# Patient Record
Sex: FEMALE | Race: WHITE | NOT HISPANIC OR LATINO
[De-identification: names, ages, dates, MRNs, and addresses within clinical notes are randomized per-mention and may not be internally consistent; named-entity substitution may affect disease eponyms.]

---

## 2017-09-11 ENCOUNTER — APPOINTMENT (OUTPATIENT)
Dept: OBGYN | Facility: CLINIC | Age: 67
End: 2017-09-11
Payer: MEDICARE

## 2017-09-11 PROCEDURE — G0101: CPT

## 2017-12-06 ENCOUNTER — APPOINTMENT (OUTPATIENT)
Dept: OBGYN | Facility: CLINIC | Age: 67
End: 2017-12-06
Payer: MEDICARE

## 2017-12-06 PROCEDURE — 99214 OFFICE O/P EST MOD 30 MIN: CPT

## 2018-09-12 ENCOUNTER — APPOINTMENT (OUTPATIENT)
Dept: OBGYN | Facility: CLINIC | Age: 68
End: 2018-09-12

## 2018-09-13 ENCOUNTER — APPOINTMENT (OUTPATIENT)
Dept: OBGYN | Facility: CLINIC | Age: 68
End: 2018-09-13

## 2018-10-23 ENCOUNTER — APPOINTMENT (OUTPATIENT)
Dept: OBGYN | Facility: CLINIC | Age: 68
End: 2018-10-23
Payer: MEDICARE

## 2018-10-23 VITALS
DIASTOLIC BLOOD PRESSURE: 80 MMHG | HEART RATE: 71 BPM | SYSTOLIC BLOOD PRESSURE: 144 MMHG | WEIGHT: 138 LBS | BODY MASS INDEX: 24.45 KG/M2 | OXYGEN SATURATION: 98 % | HEIGHT: 63 IN | RESPIRATION RATE: 16 BRPM

## 2018-10-23 DIAGNOSIS — Z13.820 ENCOUNTER FOR SCREENING FOR OSTEOPOROSIS: ICD-10-CM

## 2018-10-23 DIAGNOSIS — R92.2 INCONCLUSIVE MAMMOGRAM: ICD-10-CM

## 2018-10-23 DIAGNOSIS — Z80.3 FAMILY HISTORY OF MALIGNANT NEOPLASM OF BREAST: ICD-10-CM

## 2018-10-23 DIAGNOSIS — O30.009 TWIN PREGNANCY, UNSPECIFIED NUMBER OF PLACENTA AND UNSPECIFIED NUMBER OF AMNIOTIC SACS, UNSPECIFIED TRIMESTER: ICD-10-CM

## 2018-10-23 DIAGNOSIS — Z12.31 ENCOUNTER FOR SCREENING MAMMOGRAM FOR MALIGNANT NEOPLASM OF BREAST: ICD-10-CM

## 2018-10-23 DIAGNOSIS — Z80.7 FAMILY HISTORY OF OTHER MALIGNANT NEOPLASMS OF LYMPHOID, HEMATOPOIETIC AND RELATED TISSUES: ICD-10-CM

## 2018-10-23 DIAGNOSIS — N39.3 STRESS INCONTINENCE (FEMALE) (MALE): ICD-10-CM

## 2018-10-23 DIAGNOSIS — Z80.42 FAMILY HISTORY OF MALIGNANT NEOPLASM OF PROSTATE: ICD-10-CM

## 2018-10-23 DIAGNOSIS — Z80.1 FAMILY HISTORY OF MALIGNANT NEOPLASM OF TRACHEA, BRONCHUS AND LUNG: ICD-10-CM

## 2018-10-23 DIAGNOSIS — K46.9 UNSPECIFIED ABDOMINAL HERNIA W/OUT OBSTRUCTION OR GANGRENE: ICD-10-CM

## 2018-10-23 DIAGNOSIS — Z87.59 PERSONAL HISTORY OF OTHER COMPLICATIONS OF PREGNANCY, CHILDBIRTH AND THE PUERPERIUM: ICD-10-CM

## 2018-10-23 DIAGNOSIS — D3A.8 OTHER BENIGN NEUROENDOCRINE TUMORS: ICD-10-CM

## 2018-10-23 PROCEDURE — G0101: CPT

## 2018-10-23 RX ORDER — OMEPRAZOLE 20 MG/1
20 CAPSULE, DELAYED RELEASE ORAL
Refills: 0 | Status: ACTIVE | COMMUNITY

## 2018-10-24 LAB — HPV HIGH+LOW RISK DNA PNL CVX: NOT DETECTED

## 2018-10-29 LAB — CYTOLOGY CVX/VAG DOC THIN PREP: NORMAL

## 2018-11-14 ENCOUNTER — APPOINTMENT (OUTPATIENT)
Dept: UROGYNECOLOGY | Facility: CLINIC | Age: 68
End: 2018-11-14
Payer: MEDICARE

## 2018-11-14 VITALS
HEART RATE: 74 BPM | BODY MASS INDEX: 24.63 KG/M2 | WEIGHT: 139 LBS | DIASTOLIC BLOOD PRESSURE: 82 MMHG | HEIGHT: 63 IN | SYSTOLIC BLOOD PRESSURE: 145 MMHG

## 2018-11-14 DIAGNOSIS — N39.41 URGE INCONTINENCE: ICD-10-CM

## 2018-11-14 DIAGNOSIS — N36.41 HYPERMOBILITY OF URETHRA: ICD-10-CM

## 2018-11-14 DIAGNOSIS — R39.15 URGENCY OF URINATION: ICD-10-CM

## 2018-11-14 LAB
BILIRUB UR QL STRIP: NORMAL
CLARITY UR: CLEAR
COLLECTION METHOD: NORMAL
GLUCOSE UR-MCNC: NORMAL
HCG UR QL: 0.2 EU/DL
HGB UR QL STRIP.AUTO: NORMAL
KETONES UR-MCNC: NORMAL
LEUKOCYTE ESTERASE UR QL STRIP: NORMAL
NITRITE UR QL STRIP: NORMAL
PH UR STRIP: 8.5
PROT UR STRIP-MCNC: NORMAL
SP GR UR STRIP: 1.01

## 2018-11-14 PROCEDURE — 99204 OFFICE O/P NEW MOD 45 MIN: CPT | Mod: 25

## 2018-11-14 PROCEDURE — 51701 INSERT BLADDER CATHETER: CPT

## 2018-11-14 PROCEDURE — 81003 URINALYSIS AUTO W/O SCOPE: CPT | Mod: QW

## 2018-12-05 ENCOUNTER — APPOINTMENT (OUTPATIENT)
Dept: UROGYNECOLOGY | Facility: CLINIC | Age: 68
End: 2018-12-05

## 2019-02-06 ENCOUNTER — OUTPATIENT (OUTPATIENT)
Dept: OUTPATIENT SERVICES | Facility: HOSPITAL | Age: 69
LOS: 1 days | End: 2019-02-06

## 2019-02-06 ENCOUNTER — APPOINTMENT (OUTPATIENT)
Dept: UROGYNECOLOGY | Facility: CLINIC | Age: 69
End: 2019-02-06
Payer: MEDICARE

## 2019-02-06 PROCEDURE — A4562: CPT

## 2019-02-06 PROCEDURE — 57160 INSERT PESSARY/OTHER DEVICE: CPT

## 2019-02-27 ENCOUNTER — APPOINTMENT (OUTPATIENT)
Dept: UROGYNECOLOGY | Facility: CLINIC | Age: 69
End: 2019-02-27
Payer: MEDICARE

## 2019-02-27 PROCEDURE — 99214 OFFICE O/P EST MOD 30 MIN: CPT

## 2019-02-27 NOTE — PROCEDURE
[Good Fit] : fits well [Discharge] : there is vaginal discharge [Pessary Inserted] : inserted [Pessary Washed] : washed [H2O] : H2O [None] : no bleeding [Bladder Scan Completed] : a pelvic/bladder ultrasound was performed to determine the residual volume. [Residual Volume ___] : the final residual volume was [unfilled] cc [Medication Review] : Medicaiton Review: Patient verbalizes understanding of risks and benefits [Fluid Management] : Fluid Management: patient verbalizes understanding 6-10 cups per day [Bowel Management] : Bowel Management: patient verbalizes understanding of daily dietary fiber intake [Bladder Training] : Bladder Training: Patient given information with verbal understanding [Refit] : refit is not needed [Erosion] : no evidence of erosion [Erythema] : no erythema [Infection] : no evidence of infection [FreeTextEntry1] : RS #5 [de-identified] : scant yellow leukorrhea [FreeTextEntry3] : Advised OTC [place] Jelly vaginal lubricant PV BIW HS & with pessary care [FreeTextEntry4] : Reinforced avoid constipation w/ daily fiber/fluid intake [FreeTextEntry8] : Demonstrated & observed pt perform pessary care w/o difficulties. Con't weekly self pessary care and proper daily pericare

## 2019-02-27 NOTE — DISCUSSION/SUMMARY
[FreeTextEntry1] : x12 week f/u POP and pessary care.  Con't performing weekly self pessary care\par Advised OTC KY Jelly vaginal lubricant PV BIW HS & with pessary care\par Reinforced avoid constipation with daily fiber/fluid intake\par Instructed pt to call the office if any problems or concerns and she verbalized understanding.\par

## 2019-02-27 NOTE — HISTORY OF PRESENT ILLNESS
[FreeTextEntry1] : Pt w/ hx POP recently fitted 2 weeks ago with RS #4 pessary returns today for pessary check.  Pt is happy with the support provided by pessary and denies any abd/pelvic/vaginal pains, bleeding, urinary or BM discomforts with it.  Feels empty after voiding.  Denies constipation.  Notes with more vaginal discharge, but denies any associated odor, vaginal itching or burning.  Pt has been performing self pessary care at home 2-3x/week and says feels slight vaginal irritation after removal & reinsertions, but resolves spontaneously.  Pt is not using any OTC vaginal lubricants with pessary.  She is interested in resuming sexual intercourse with spouse.

## 2019-06-04 ENCOUNTER — APPOINTMENT (OUTPATIENT)
Dept: UROGYNECOLOGY | Facility: CLINIC | Age: 69
End: 2019-06-04
Payer: MEDICARE

## 2019-06-04 PROCEDURE — 99213 OFFICE O/P EST LOW 20 MIN: CPT

## 2019-06-04 NOTE — HISTORY OF PRESENT ILLNESS
[FreeTextEntry1] : Pt w/ known hx POP supported by RS #5 pessary returns to office today for f/u care.  Pt is happy with support provided by pessary and reports urgency has improved.  Pt is sexually active with pessary in place without c/o any discomforts.  She also exercises daily with sit ups and push ups and feels pessary shift downward, but pessary will spontaneously move back up.  Pt reports x3-4wks ago noted pantiliner with streak of vaginal bleeding x1-2 days, but spontaneously resolved.  She admits recently with constipation/straining.  Pt denies abd/pelvic/vaginal pains or urinary discomforts.  She had temporarily stopped performing self pessary care since last visit 12 weeks ago to avoid vaginal irritation from removal & reinsertions.  Not using any OTC vaginal lubricants/creams.

## 2019-06-04 NOTE — PROCEDURE
[Good Fit] : fits well [Erythema] : erythema noted [Discharge] : there is vaginal discharge [Pessary Washed] : washed [Pessary Inserted] : inserted [None] : no bleeding [H2O] : H2O [Medication Review] : Medicaiton Review: Patient verbalizes understanding of risks and benefits [Fluid Management] : Fluid Management: patient verbalizes understanding 6-10 cups per day [Bladder Training] : Bladder Training: Patient given information with verbal understanding [Bowel Management] : Bowel Management: patient verbalizes understanding of daily dietary fiber intake [Refit] : refit is not needed [Erosion] : no evidence of erosion [Infection] : no evidence of infection [de-identified] : mild at posterior wall [FreeTextEntry1] : RS #5 [FreeTextEntry3] : Advised OTC Replens or Luvena vaginal cream PV TIW HS and KY Jelly lubricant w/ pessary use [de-identified] : scant white leukorrhea [FreeTextEntry4] : Reinforced avoid constipation with daily fiber/fluid intake and Colace stool softener daily PRN [FreeTextEntry8] : Reinforced once/weekly self pessary care and con't proper daily pericare

## 2019-06-04 NOTE — PHYSICAL EXAM
[Well developed] : well developed [Well Nourished] : ~L well nourished [No Acute Distress] : in no acute distress [Oriented x3] : oriented to person, place, and time [Good Hygeine] : demonstrates good hygeine [Normal Memory] : ~T memory was ~L unimpaired [Normal Mood/Affect] : mood and affect are normal [Warm and Dry] : was warm and dry to touch [Normal Gait] : gait was normal [No Invol. Movements] : no involuntary movements were seen [Vulvar Atrophy] : vulvar atrophy [Labia Majora] : were normal [Erythematous] : erythema [Labia Minora] : were normal [Atrophy] : atrophy [Cystocele] : a cystocele [Discharge] : a  ~M vaginal discharge was present [Scant] : scant [White] : white [Mucoid] : mucoid [Thin] : thin [No Bleeding] : there was no active vaginal bleeding [Anxiety] : patient is not anxious [Tenderness] : ~T no ~M abdominal tenderness observed [Foul Smelling] : not foul smelling [Distended] : not distended [de-identified] : no prolapse or pessary bulging noted at introitus [FreeTextEntry4] : RS pessary intact in posterior vault

## 2019-06-04 NOTE — DISCUSSION/SUMMARY
[FreeTextEntry1] : x6 months f/u POP and pessary care or sooner PRN\par Reinforced performing once/weekly self pessary care and con't proper daily pericare\par Advised OTC Replens or Luvena vaginal cream PV TIW HS and KY Jelly lubricant w/ pessary use\par Reinforced avoid constipation with daily fiber/fluid intake and Colace stool softener daily PRN\par Instructed pt to call the office if any problems or concerns and she verbalized understanding.\par \par

## 2019-06-07 ENCOUNTER — MESSAGE (OUTPATIENT)
Age: 69
End: 2019-06-07

## 2019-07-02 ENCOUNTER — LABORATORY RESULT (OUTPATIENT)
Age: 69
End: 2019-07-02

## 2019-07-02 ENCOUNTER — APPOINTMENT (OUTPATIENT)
Dept: OBGYN | Facility: CLINIC | Age: 69
End: 2019-07-02
Payer: MEDICARE

## 2019-07-02 VITALS
SYSTOLIC BLOOD PRESSURE: 118 MMHG | WEIGHT: 136 LBS | HEART RATE: 76 BPM | BODY MASS INDEX: 24.1 KG/M2 | OXYGEN SATURATION: 97 % | HEIGHT: 63 IN | DIASTOLIC BLOOD PRESSURE: 72 MMHG

## 2019-07-02 DIAGNOSIS — N81.4 UTEROVAGINAL PROLAPSE, UNSPECIFIED: ICD-10-CM

## 2019-07-02 DIAGNOSIS — N94.9 UNSPECIFIED CONDITION ASSOCIATED WITH FEMALE GENITAL ORGANS AND MENSTRUAL CYCLE: ICD-10-CM

## 2019-07-02 PROCEDURE — 58100 BIOPSY OF UTERUS LINING: CPT

## 2019-07-02 PROCEDURE — 99214 OFFICE O/P EST MOD 30 MIN: CPT | Mod: 25

## 2019-07-02 RX ORDER — BACILLUS COAGULANS/INULIN 1B-250 MG
CAPSULE ORAL
Refills: 0 | Status: DISCONTINUED | COMMUNITY
End: 2019-07-02

## 2019-07-02 RX ORDER — RANITIDINE HCL 150 MG
150 CAPSULE ORAL
Refills: 0 | Status: ACTIVE | COMMUNITY

## 2019-07-02 NOTE — PHYSICAL EXAM
[Normal] : uterus [Cystocele] : a cystocele [Uterine Prolapse] : uterine prolapse [No Bleeding] : there was no active vaginal bleeding [Uterine Adnexae] : were not tender and not enlarged [FreeTextEntry4] : pessary replaced after endom bx.ring with support

## 2019-07-02 NOTE — CHIEF COMPLAINT
[Follow Up] : follow up GYN visit [FreeTextEntry1] : menop disorder,ut prolapse with abnl vag bleeing

## 2019-07-02 NOTE — PROCEDURE
[Endometrial Biopsy] : Endometrial biopsy [Post-Menop. Bleeding] : post-menopausal bleeding [Risks] : risks [Patient] : patient [Bleeding] : bleeding [CONSENT OBTAINED] : written consent was obtained prior to the procedure. [Scant] : a scant [Sent to Histology] : the specimen was place in buffered formalin and sent for pathlogy [ECC] : Endocervical curettage was also performed [Tolerated Well] : the patient tolerated the procedure well [No Complications] : there were no complications

## 2019-09-10 ENCOUNTER — APPOINTMENT (OUTPATIENT)
Dept: OBGYN | Facility: CLINIC | Age: 69
End: 2019-09-10
Payer: MEDICARE

## 2019-09-10 VITALS
DIASTOLIC BLOOD PRESSURE: 78 MMHG | WEIGHT: 138 LBS | OXYGEN SATURATION: 98 % | HEIGHT: 63 IN | BODY MASS INDEX: 24.45 KG/M2 | SYSTOLIC BLOOD PRESSURE: 120 MMHG | HEART RATE: 73 BPM

## 2019-09-10 DIAGNOSIS — N83.201 UNSPECIFIED OVARIAN CYST, RIGHT SIDE: ICD-10-CM

## 2019-09-10 DIAGNOSIS — N81.11 CYSTOCELE, MIDLINE: ICD-10-CM

## 2019-09-10 DIAGNOSIS — N83.202 UNSPECIFIED OVARIAN CYST, RIGHT SIDE: ICD-10-CM

## 2019-09-10 PROCEDURE — 99214 OFFICE O/P EST MOD 30 MIN: CPT

## 2019-09-10 NOTE — PHYSICAL EXAM
[Awake] : awake [Alert] : alert [Soft] : soft [] : which was reducible [Abdomen Hernia Ventral] : a ventral hernia was present [Oriented x3] : oriented to person, place, and time [Normal] : uterus [Cystocele] : a cystocele [Uterine Adnexae] : were not tender and not enlarged [No Bleeding] : there was no active vaginal bleeding [Acute Distress] : no acute distress [Mass] : no breast mass [Nipple Discharge] : no nipple discharge [Axillary LAD] : no axillary lymphadenopathy [Tender] : non tender [FreeTextEntry4] : pessary removed,cleaned and replaced. Vagina cleansed with diluted H202

## 2019-09-10 NOTE — CHIEF COMPLAINT
[Follow Up] : follow up GYN visit [FreeTextEntry1] : menopausal disorder with cystocel. Doing well with pessary-cleans herself-requests I do today\par Rev TVS with pt

## 2019-09-10 NOTE — COUNSELING
[Breast Self Exam] : breast self exam [Nutrition] : nutrition [Exercise] : exercise [Vitamins/Supplements] : vitamins/supplements [Bladder Hygiene] : bladder hygiene [Medication Management] : medication management [Vulvar Hygiene] : vulvar hygiene

## 2019-09-27 DIAGNOSIS — Z01.818 ENCOUNTER FOR OTHER PREPROCEDURAL EXAMINATION: ICD-10-CM

## 2020-04-30 RX ORDER — ESTRADIOL 0.1 MG/G
0.1 CREAM VAGINAL
Qty: 1 | Refills: 4 | Status: ACTIVE | COMMUNITY
Start: 2019-07-02 | End: 1900-01-01

## 2020-05-01 ENCOUNTER — APPOINTMENT (OUTPATIENT)
Dept: OBGYN | Facility: CLINIC | Age: 70
End: 2020-05-01
Payer: MEDICARE

## 2020-05-01 PROCEDURE — 99212 OFFICE O/P EST SF 10 MIN: CPT | Mod: 95

## 2020-05-01 RX ORDER — MUPIROCIN 20 MG/G
2 OINTMENT TOPICAL
Qty: 22 | Refills: 0 | Status: ACTIVE | COMMUNITY
Start: 2020-01-24

## 2020-05-01 RX ORDER — ALBUTEROL SULFATE 90 UG/1
108 (90 BASE) AEROSOL, METERED RESPIRATORY (INHALATION)
Qty: 8 | Refills: 0 | Status: ACTIVE | COMMUNITY
Start: 2020-01-15

## 2020-05-01 RX ORDER — FAMOTIDINE 20 MG/1
20 TABLET, FILM COATED ORAL
Qty: 30 | Refills: 0 | Status: ACTIVE | COMMUNITY
Start: 2020-04-20

## 2020-05-01 RX ORDER — PREDNISONE 20 MG/1
20 TABLET ORAL
Qty: 5 | Refills: 0 | Status: ACTIVE | COMMUNITY
Start: 2020-03-13

## 2020-05-01 NOTE — HISTORY OF PRESENT ILLNESS
[Home] : at home, [unfilled] , at the time of the visit. [Other Location: e.g. Home (Enter Location, City,State)___] : at [unfilled] [Patient] : the patient [Self] : self

## 2020-05-01 NOTE — CHIEF COMPLAINT
[Follow Up] : follow up GYN visit [FreeTextEntry1] : Given the extraordinary circumstances surrounding the COVID-19 pandemic with [place] as the epicenter and both the [facility] and [facility] guidelines protecting individuals & the community, I called the patient for a telemedicine consultation in lieu of the scheduled in-person visit. The patient gave verbal consent to this billable encounter and agreed to be financially responsible for any co-payment, co-insurance and/or deductible. She understands that this video visit is offered for her convenience and that she is able to cancel and reschedule for an in-person appointment if desired. She also acknowledged that sensitive medical information may be discussed during this video visit and that it is her responsibility to locate herself in a location that ensures privacy to her level of comfort. All current medical problems, medications and allergies were reviewed. The patient understands the plan and is in agreement. All questions were answered. All additional follow up visits will be scheduled taking into consideration ongoing COVID-19 precautions when appropriate. This was a telehealth service rendered by [employer] .\par

## 2020-05-01 NOTE — COUNSELING
[Breast Self Exam] : breast self exam [Nutrition] : nutrition [Vitamins/Supplements] : vitamins/supplements [Exercise] : exercise [Medication Management] : medication management [Bladder Hygiene] : bladder hygiene [Vulvar Hygiene] : vulvar hygiene

## 2020-06-11 ENCOUNTER — APPOINTMENT (OUTPATIENT)
Dept: OBGYN | Facility: CLINIC | Age: 70
End: 2020-06-11
Payer: MEDICARE

## 2020-06-11 VITALS
WEIGHT: 143 LBS | HEART RATE: 80 BPM | BODY MASS INDEX: 25.34 KG/M2 | SYSTOLIC BLOOD PRESSURE: 120 MMHG | HEIGHT: 63 IN | TEMPERATURE: 97.2 F | DIASTOLIC BLOOD PRESSURE: 62 MMHG

## 2020-06-11 DIAGNOSIS — N81.2 INCOMPLETE UTEROVAGINAL PROLAPSE: ICD-10-CM

## 2020-06-11 DIAGNOSIS — Z46.89 ENCOUNTER FOR FITTING AND ADJUSTMENT OF OTHER SPECIFIED DEVICES: ICD-10-CM

## 2020-06-11 DIAGNOSIS — N95.2 POSTMENOPAUSAL ATROPHIC VAGINITIS: ICD-10-CM

## 2020-06-11 DIAGNOSIS — N95.0 POSTMENOPAUSAL BLEEDING: ICD-10-CM

## 2020-06-11 DIAGNOSIS — N95.9 UNSPECIFIED MENOPAUSAL AND PERIMENOPAUSAL DISORDER: ICD-10-CM

## 2020-06-11 PROCEDURE — A4562: CPT

## 2020-06-11 PROCEDURE — 99214 OFFICE O/P EST MOD 30 MIN: CPT | Mod: 25

## 2020-06-11 PROCEDURE — 57160 INSERT PESSARY/OTHER DEVICE: CPT

## 2020-06-11 RX ORDER — AMLODIPINE BESYLATE 2.5 MG/1
2.5 TABLET ORAL
Refills: 0 | Status: DISCONTINUED | COMMUNITY
End: 2020-06-11

## 2020-06-11 RX ORDER — AMOXICILLIN 500 MG/1
500 CAPSULE ORAL
Qty: 20 | Refills: 0 | Status: DISCONTINUED | COMMUNITY
Start: 2020-03-13 | End: 2020-06-11

## 2020-06-11 RX ORDER — AZITHROMYCIN 250 MG/1
250 TABLET, FILM COATED ORAL
Qty: 6 | Refills: 0 | Status: DISCONTINUED | COMMUNITY
Start: 2020-01-15 | End: 2020-06-11

## 2020-06-11 RX ORDER — DOXYCYCLINE 100 MG/1
100 CAPSULE ORAL
Qty: 14 | Refills: 0 | Status: DISCONTINUED | COMMUNITY
Start: 2020-03-24 | End: 2020-06-11

## 2020-06-11 RX ORDER — AMOXICILLIN AND CLAVULANATE POTASSIUM 500; 125 MG/1; MG/1
500-125 TABLET, FILM COATED ORAL
Qty: 20 | Refills: 0 | Status: DISCONTINUED | COMMUNITY
Start: 2020-01-03 | End: 2020-06-11

## 2020-06-11 RX ORDER — RANITIDINE 150 MG/1
150 TABLET ORAL
Qty: 180 | Refills: 0 | Status: DISCONTINUED | COMMUNITY
Start: 2019-08-11 | End: 2020-06-11

## 2020-06-11 RX ORDER — AMLODIPINE BESYLATE 10 MG/1
10 TABLET ORAL
Qty: 90 | Refills: 0 | Status: DISCONTINUED | COMMUNITY
Start: 2019-12-04 | End: 2020-06-11

## 2020-06-12 PROBLEM — N95.9 MENOPAUSAL DISORDER: Status: ACTIVE | Noted: 2019-07-02

## 2020-06-12 PROBLEM — N95.0 POSTMENOPAUSAL VAGINAL BLEEDING: Status: ACTIVE | Noted: 2019-06-27

## 2020-06-12 PROBLEM — N95.2 POSTMENOPAUSAL ATROPHIC VAGINITIS: Status: ACTIVE | Noted: 2020-06-12

## 2020-06-12 NOTE — CHIEF COMPLAINT
[Follow Up] : follow up GYN visit [FreeTextEntry1] : menopausal disorder,uterovag prolapse as well as postmenopausal atrophic vaginitis.Pessary feels too large

## 2020-06-12 NOTE — PHYSICAL EXAM
[Cystocele] : a cystocele [Uterine Prolapse] : uterine prolapse [Normal] : uterus [Uterine Adnexae] : were not tender and not enlarged [No Bleeding] : there was no active vaginal bleeding [FreeTextEntry4] : smaller #3 ring pessary with support replaced

## 2020-06-12 NOTE — COUNSELING
[Breast Self Exam] : breast self exam [Exercise] : exercise [Nutrition] : nutrition [Vitamins/Supplements] : vitamins/supplements [Bladder Hygiene] : bladder hygiene [Vulvar Hygiene] : vulvar hygiene [Medication Management] : medication management

## 2020-06-17 ENCOUNTER — APPOINTMENT (OUTPATIENT)
Dept: OTOLARYNGOLOGY | Facility: CLINIC | Age: 70
End: 2020-06-17
Payer: MEDICARE

## 2020-06-17 VITALS
TEMPERATURE: 97.6 F | DIASTOLIC BLOOD PRESSURE: 92 MMHG | SYSTOLIC BLOOD PRESSURE: 151 MMHG | HEART RATE: 61 BPM | OXYGEN SATURATION: 98 % | BODY MASS INDEX: 24.85 KG/M2 | HEIGHT: 63.5 IN | WEIGHT: 142 LBS

## 2020-06-17 DIAGNOSIS — Z83.3 FAMILY HISTORY OF DIABETES MELLITUS: ICD-10-CM

## 2020-06-17 DIAGNOSIS — Z87.11 PERSONAL HISTORY OF PEPTIC ULCER DISEASE: ICD-10-CM

## 2020-06-17 DIAGNOSIS — Z82.49 FAMILY HISTORY OF ISCHEMIC HEART DISEASE AND OTHER DISEASES OF THE CIRCULATORY SYSTEM: ICD-10-CM

## 2020-06-17 DIAGNOSIS — Z87.01 PERSONAL HISTORY OF PNEUMONIA (RECURRENT): ICD-10-CM

## 2020-06-17 DIAGNOSIS — E04.1 NONTOXIC SINGLE THYROID NODULE: ICD-10-CM

## 2020-06-17 DIAGNOSIS — Z87.09 PERSONAL HISTORY OF OTHER DISEASES OF THE RESPIRATORY SYSTEM: ICD-10-CM

## 2020-06-17 PROCEDURE — 99203 OFFICE O/P NEW LOW 30 MIN: CPT | Mod: 25

## 2020-06-17 PROCEDURE — 31575 DIAGNOSTIC LARYNGOSCOPY: CPT

## 2020-06-17 NOTE — PHYSICAL EXAM
[Laryngoscopy Performed] : laryngoscopy was performed, see procedure section for findings [Normal] : orientation to person, place, and time: normal [de-identified] : Palpable left thyroid nodule.  Parotid and SMG normal bilaterally [de-identified] : no labored breathing

## 2020-06-17 NOTE — REVIEW OF SYSTEMS
[Patient Intake Form Reviewed] : Patient intake form was reviewed [Seasonal Allergies] : seasonal allergies [As Noted in HPI] : as noted in HPI [Negative] : Heme/Lymph

## 2020-06-17 NOTE — DATA REVIEWED
[de-identified] : 6/11/2020 CT chest report, chest x-rays [de-identified] : 6/12/20 US neck report

## 2020-06-17 NOTE — PROCEDURE
[Image(s) Captured] : image(s) captured and filed [None] : none [Unable to Cooperate with Mirror] : patient unable to cooperate with mirror [Flexible Endoscope] : examined with the flexible endoscope [de-identified] : Flexible fiberoptic laryngoscope advanced orally, no oropharyngeal lesions or masses identified, larynx visualized, adequate and symmetric cord adduction and abduction noted, there is thickening of the posterior commissure.\par  [de-identified] : Evaluation of RLN function pre-op

## 2020-06-17 NOTE — HISTORY OF PRESENT ILLNESS
[de-identified] : 70F with hx of asthma, peptic ulcers, pneumonia, and pancreatic neuroendocrine tumor s/p Whipple surgery in 2014 with Dr. Cohen at [facility] presents with an incidental finding of a thyroid nodule on a CT chest for pneumonia surveillance.  She reported wheezing during Hanukah which she thought was asthma triggered by the allergic reaction to the menorah candles.  Her rescue inhaler was not effective and she sought medical care. She has been placed on multiple Z-paks, steroids, nebulizers and on a recent CT chest, a thyroid nodule was found and she was referred here.\par \par 6/11/20 CT chest: Bronchial thickening, and linear atelectasis/scarring in the lingula and right middle lobe, without infiltrate at this time.  Left lung nodules measuring up to 5mm, no follow-up indicated as per current Fleischner guidelines in a nonsmoker. Peripherally calcified 1.8 cm left thyroid lesion.  Suggest ultrasound and possible FNA.\par \par 6/12/20 US neck:\par Right lobe measures 4.7 x 1.7 x 1.5 cm with multiple sub cm cysts and a 1.5 x 1.4 x 1.3 cm partially calcified solid nodule in the midportion of the left lobe.\par Left lobe measures 4.9 x 1.8 x 1.4 cm\par Isthmus measures 2 mm in diameter.\par \par She denies hx radiation exposure. No dysphagia, no odynophagia, no dysphonia.

## 2020-06-17 NOTE — ASSESSMENT
[FreeTextEntry1] : 69F with a 1.5 cm partially calcified left thyroid nodule noted on CT chest during pneumonia surveillance.\par \par Plan:\par - TFTs\par - FNA thyroid with Afirma.\par - If FNA benign, will observe.  If malignant, plan for thyroidectomy.\par - Will call pt with results and plan of care.\par - Pt verbalized understanding of above.

## 2020-06-22 LAB
T3 SERPL-MCNC: 115 NG/DL
T3FREE SERPL-MCNC: 2.76 PG/ML
T4 FREE SERPL-MCNC: 1.2 NG/DL
T4 SERPL-MCNC: 7.6 UG/DL
THYROGLOB AB SERPL-ACNC: <20 IU/ML
THYROPEROXIDASE AB SERPL IA-ACNC: 13.6 IU/ML
TSH SERPL-ACNC: 2.17 UIU/ML

## 2020-06-23 ENCOUNTER — LABORATORY RESULT (OUTPATIENT)
Age: 70
End: 2020-06-23

## 2020-06-25 ENCOUNTER — RESULT REVIEW (OUTPATIENT)
Age: 70
End: 2020-06-25

## 2020-06-25 ENCOUNTER — APPOINTMENT (OUTPATIENT)
Dept: ULTRASOUND IMAGING | Facility: HOSPITAL | Age: 70
End: 2020-06-25
Payer: MEDICARE

## 2020-06-25 ENCOUNTER — APPOINTMENT (OUTPATIENT)
Dept: ULTRASOUND IMAGING | Facility: CLINIC | Age: 70
End: 2020-06-25

## 2020-06-25 ENCOUNTER — OUTPATIENT (OUTPATIENT)
Dept: OUTPATIENT SERVICES | Facility: HOSPITAL | Age: 70
LOS: 1 days | End: 2020-06-25
Payer: MEDICARE

## 2020-06-25 PROCEDURE — 88173 CYTOPATH EVAL FNA REPORT: CPT | Mod: 26

## 2020-06-25 PROCEDURE — 10005 FNA BX W/US GDN 1ST LES: CPT

## 2020-06-25 PROCEDURE — 88305 TISSUE EXAM BY PATHOLOGIST: CPT

## 2020-06-25 PROCEDURE — 88305 TISSUE EXAM BY PATHOLOGIST: CPT | Mod: 26

## 2020-06-25 PROCEDURE — 88173 CYTOPATH EVAL FNA REPORT: CPT

## 2020-06-29 LAB — NON-GYNECOLOGICAL CYTOLOGY STUDY: SIGNIFICANT CHANGE UP

## 2020-10-08 DIAGNOSIS — N83.209 UNSPECIFIED OVARIAN CYST, UNSPECIFIED SIDE: ICD-10-CM

## 2020-10-08 DIAGNOSIS — N93.9 ABNORMAL UTERINE AND VAGINAL BLEEDING, UNSPECIFIED: ICD-10-CM

## 2020-10-12 ENCOUNTER — EMERGENCY (EMERGENCY)
Facility: HOSPITAL | Age: 70
LOS: 1 days | Discharge: HOME | End: 2020-10-12
Attending: EMERGENCY MEDICINE
Payer: MEDICARE

## 2020-10-12 VITALS
DIASTOLIC BLOOD PRESSURE: 98 MMHG | SYSTOLIC BLOOD PRESSURE: 148 MMHG | RESPIRATION RATE: 20 BRPM | OXYGEN SATURATION: 98 % | WEIGHT: 141.98 LBS | TEMPERATURE: 99 F | HEIGHT: 63 IN | HEART RATE: 85 BPM

## 2020-10-12 VITALS
TEMPERATURE: 98 F | SYSTOLIC BLOOD PRESSURE: 137 MMHG | OXYGEN SATURATION: 100 % | DIASTOLIC BLOOD PRESSURE: 92 MMHG | RESPIRATION RATE: 17 BRPM | HEART RATE: 85 BPM

## 2020-10-12 DIAGNOSIS — C25.9 MALIGNANT NEOPLASM OF PANCREAS, UNSPECIFIED: Chronic | ICD-10-CM

## 2020-10-12 LAB
ALBUMIN SERPL ELPH-MCNC: 4.6 G/DL — SIGNIFICANT CHANGE UP (ref 3.3–5)
ALP SERPL-CCNC: 107 U/L — SIGNIFICANT CHANGE UP (ref 40–120)
ALT FLD-CCNC: 16 U/L — SIGNIFICANT CHANGE UP (ref 10–45)
ANION GAP SERPL CALC-SCNC: 13 MMOL/L — SIGNIFICANT CHANGE UP (ref 5–17)
AST SERPL-CCNC: 21 U/L — SIGNIFICANT CHANGE UP (ref 10–40)
BASOPHILS # BLD AUTO: 0.02 K/UL — SIGNIFICANT CHANGE UP (ref 0–0.2)
BASOPHILS NFR BLD AUTO: 0.3 % — SIGNIFICANT CHANGE UP (ref 0–2)
BILIRUB SERPL-MCNC: 0.7 MG/DL — SIGNIFICANT CHANGE UP (ref 0.2–1.2)
BUN SERPL-MCNC: 9 MG/DL — SIGNIFICANT CHANGE UP (ref 7–23)
CALCIUM SERPL-MCNC: 9.3 MG/DL — SIGNIFICANT CHANGE UP (ref 8.4–10.5)
CHLORIDE SERPL-SCNC: 95 MMOL/L — LOW (ref 96–108)
CO2 SERPL-SCNC: 23 MMOL/L — SIGNIFICANT CHANGE UP (ref 22–31)
CREAT SERPL-MCNC: 0.54 MG/DL — SIGNIFICANT CHANGE UP (ref 0.5–1.3)
EOSINOPHIL # BLD AUTO: 0.03 K/UL — SIGNIFICANT CHANGE UP (ref 0–0.5)
EOSINOPHIL NFR BLD AUTO: 0.5 % — SIGNIFICANT CHANGE UP (ref 0–6)
GLUCOSE SERPL-MCNC: 116 MG/DL — HIGH (ref 70–99)
HCT VFR BLD CALC: 42.1 % — SIGNIFICANT CHANGE UP (ref 34.5–45)
HGB BLD-MCNC: 14.4 G/DL — SIGNIFICANT CHANGE UP (ref 11.5–15.5)
IMM GRANULOCYTES NFR BLD AUTO: 0.5 % — SIGNIFICANT CHANGE UP (ref 0–1.5)
LYMPHOCYTES # BLD AUTO: 0.96 K/UL — LOW (ref 1–3.3)
LYMPHOCYTES # BLD AUTO: 14.5 % — SIGNIFICANT CHANGE UP (ref 13–44)
MCHC RBC-ENTMCNC: 29.4 PG — SIGNIFICANT CHANGE UP (ref 27–34)
MCHC RBC-ENTMCNC: 34.2 GM/DL — SIGNIFICANT CHANGE UP (ref 32–36)
MCV RBC AUTO: 85.9 FL — SIGNIFICANT CHANGE UP (ref 80–100)
MONOCYTES # BLD AUTO: 0.71 K/UL — SIGNIFICANT CHANGE UP (ref 0–0.9)
MONOCYTES NFR BLD AUTO: 10.7 % — SIGNIFICANT CHANGE UP (ref 2–14)
NEUTROPHILS # BLD AUTO: 4.89 K/UL — SIGNIFICANT CHANGE UP (ref 1.8–7.4)
NEUTROPHILS NFR BLD AUTO: 73.5 % — SIGNIFICANT CHANGE UP (ref 43–77)
NRBC # BLD: 0 /100 WBCS — SIGNIFICANT CHANGE UP (ref 0–0)
PLATELET # BLD AUTO: 215 K/UL — SIGNIFICANT CHANGE UP (ref 150–400)
POTASSIUM SERPL-MCNC: 3.5 MMOL/L — SIGNIFICANT CHANGE UP (ref 3.5–5.3)
POTASSIUM SERPL-SCNC: 3.5 MMOL/L — SIGNIFICANT CHANGE UP (ref 3.5–5.3)
PROT SERPL-MCNC: 7.5 G/DL — SIGNIFICANT CHANGE UP (ref 6–8.3)
RBC # BLD: 4.9 M/UL — SIGNIFICANT CHANGE UP (ref 3.8–5.2)
RBC # FLD: 12.2 % — SIGNIFICANT CHANGE UP (ref 10.3–14.5)
SODIUM SERPL-SCNC: 131 MMOL/L — LOW (ref 135–145)
WBC # BLD: 6.64 K/UL — SIGNIFICANT CHANGE UP (ref 3.8–10.5)
WBC # FLD AUTO: 6.64 K/UL — SIGNIFICANT CHANGE UP (ref 3.8–10.5)

## 2020-10-12 PROCEDURE — 85025 COMPLETE CBC W/AUTO DIFF WBC: CPT

## 2020-10-12 PROCEDURE — 99284 EMERGENCY DEPT VISIT MOD MDM: CPT

## 2020-10-12 PROCEDURE — 80053 COMPREHEN METABOLIC PANEL: CPT

## 2020-10-12 PROCEDURE — 99283 EMERGENCY DEPT VISIT LOW MDM: CPT

## 2020-10-12 RX ORDER — SODIUM CHLORIDE 9 MG/ML
1000 INJECTION INTRAMUSCULAR; INTRAVENOUS; SUBCUTANEOUS ONCE
Refills: 0 | Status: COMPLETED | OUTPATIENT
Start: 2020-10-12 | End: 2020-10-12

## 2020-10-12 RX ORDER — CYCLOBENZAPRINE HYDROCHLORIDE 10 MG/1
1 TABLET, FILM COATED ORAL
Qty: 15 | Refills: 0
Start: 2020-10-12 | End: 2020-10-16

## 2020-10-12 RX ORDER — ACETAMINOPHEN 500 MG
650 TABLET ORAL ONCE
Refills: 0 | Status: COMPLETED | OUTPATIENT
Start: 2020-10-12 | End: 2020-10-12

## 2020-10-12 RX ADMIN — SODIUM CHLORIDE 1000 MILLILITER(S): 9 INJECTION INTRAMUSCULAR; INTRAVENOUS; SUBCUTANEOUS at 09:07

## 2020-10-12 RX ADMIN — Medication 650 MILLIGRAM(S): at 09:07

## 2020-10-12 NOTE — ED PROVIDER NOTE - PROGRESS NOTE DETAILS
call placed to pts pmd Dr Martinez Carranza PA-C hyponatremia/hypochloremia improved from yesterday per paper reports, pt well appearing, ambulating without difficulty from leg pain. call placed to pts PMD Dr Martinez Carranza PA-C case d/w Dr Henriquez - discussed ED course at [facility] and [facility] with lab/imaging results. given unclear etiology of hyponatremia in pt otherwise well appearing, ambulatory, agreed to plan to DC with followup with him in 1-2 days, nephrology followup and spine followup. Will dc with follow up. Discussed plan and return precautions with patient who understands and agrees. All questions answered. - Alin Carranza PA-C

## 2020-10-12 NOTE — ED PROVIDER NOTE - PATIENT PORTAL LINK FT
You can access the FollowMyHealth Patient Portal offered by [facility] by registering at the following website: [email or web address]. By joining [employer]’s FollowMyHealth portal, you will also be able to view your health information using other applications (apps) compatible with our system.

## 2020-10-12 NOTE — ED PROVIDER NOTE - NSFOLLOWUPINSTRUCTIONS_ED_ALL_ED_FT
*Please follow up with your primary care doctor Dr Henriquez within the next 1-3 days  *For your low sodium level, we recommend you follow up with outpatient nephrology within 1 week - see attached contact info.  *For your lower extremity pain, follow up with the [facility] by calling [phone].     *Bring all printed lab/test results to your appointment(s).*     For continued pain take acetaminophen 500-1000mg every 6 hrs as needed for pain. DO NOT EXCEED 4000mg DAILY.     Continue all home medications as prescribed.     Stay well hydrated.    Return to the ED for worsening pain, difficulty walking, loss of control of bladder/bowel, confusion, loss of consciousness, or any other concerns.

## 2020-10-12 NOTE — ED ADULT TRIAGE NOTE - DOMESTIC TRAVEL HIGH RISK QUESTION
Patient is a 61y old  Female who presents with a chief complaint of Abdominal pain (19 Feb 2019 09:42)      SUBJECTIVE / OVERNIGHT EVENTS:  abd pain came back after eating.  'I ate a little too much'  5-6 times loose stool past 24 hours  on pain meds.  no n/v.  no cp/no sob.       Vital Signs Last 24 Hrs  T(C): 36.8 (19 Feb 2019 07:59), Max: 36.8 (18 Feb 2019 21:23)  T(F): 98.3 (19 Feb 2019 07:59), Max: 98.3 (18 Feb 2019 21:23)  HR: 65 (19 Feb 2019 07:59) (65 - 69)  BP: 142/83 (19 Feb 2019 07:59) (135/78 - 148/78)  BP(mean): --  RR: 18 (19 Feb 2019 07:59) (18 - 20)  SpO2: 97% (19 Feb 2019 07:59) (97% - 98%)  I&O's Summary    18 Feb 2019 07:01  -  19 Feb 2019 07:00  --------------------------------------------------------  IN: 0 mL / OUT: 0 mL / NET: 0 mL    19 Feb 2019 07:01  -  19 Feb 2019 13:46  --------------------------------------------------------  IN: 550 mL / OUT: 0 mL / NET: 550 mL        PHYSICAL EXAM:  GENERAL: NAD, Comfortable  HEAD:  Atraumatic, Normocephalic  EYES: EOMI, PERRLA, conjunctiva and sclera clear  NECK: Supple, No JVD  CHEST/LUNG: Clear to auscultation bilaterally; No wheeze  HEART: Regular rate and rhythm; No murmurs, rubs, or gallops  ABDOMEN: Soft, +tenderness diffuse, improved, Nondistended; Bowel sounds present  Neuro: AAO x 3, no focal deficit, 5/5 b/l extremities  EXTREMITIES:  2+ Peripheral Pulses, No clubbing, cyanosis, or edema  SKIN: No rashes or lesions      LABS:                        11.0   6.21  )-----------( 217      ( 18 Feb 2019 09:50 )             32.4     02-18    139  |  102  |  9   ----------------------------<  68<L>  3.6   |  21<L>  |  0.72    Ca    8.6      18 Feb 2019 07:06        CAPILLARY BLOOD GLUCOSE                RADIOLOGY & ADDITIONAL TESTS:    Imaging Personally Reviewed:  [x] YES  [ ] NO    Consultant(s) Notes Reviewed:  [x] YES  [ ] NO      MEDICATIONS  (STANDING):  anastrozole 1 milliGRAM(s) Oral daily  famotidine    Tablet 20 milliGRAM(s) Oral daily  heparin  Injectable 5000 Unit(s) SubCutaneous every 8 hours  nicotine -  14 mG/24Hr(s) Patch 1 patch Transdermal daily  pantoprazole  Injectable 40 milliGRAM(s) IV Push two times a day  sertraline 200 milliGRAM(s) Oral daily  sodium chloride 0.9%. 1000 milliLiter(s) (75 mL/Hr) IV Continuous <Continuous>  vancomycin    Solution 125 milliGRAM(s) Oral every 6 hours    MEDICATIONS  (PRN):  acetaminophen   Tablet .. 650 milliGRAM(s) Oral every 6 hours PRN Mild Pain (1 - 3)  aluminum hydroxide/magnesium hydroxide/simethicone Suspension 30 milliLiter(s) Oral every 4 hours PRN Dyspepsia  ketorolac   Injectable 15 milliGRAM(s) IV Push every 8 hours PRN Moderate Pain (4 - 6)  morphine  - Injectable 2 milliGRAM(s) IV Push every 4 hours PRN Severe Pain (7 - 10)  morphine  - Injectable 1 milliGRAM(s) IV Push every 2 hours PRN Breakthrough pain      Care Discussed with Consultants/Other Providers [x] YES  [ ] NO    HEALTH ISSUES - PROBLEM Dx:  GERD (gastroesophageal reflux disease): GERD (gastroesophageal reflux disease)  Tobacco dependence: Tobacco dependence  Anxiety: Anxiety  Sepsis: Sepsis  Pancolitis: Pancolitis No

## 2020-10-12 NOTE — ED ADULT TRIAGE NOTE - CHIEF COMPLAINT QUOTE
"I have pain on my right leg since Friday and it is getting worse, I am getting spasms and it is very painful - I went to the hospital yesterday and they wanted to transfer me and they could not, they said I have hyponatremia"

## 2020-10-12 NOTE — ED PROVIDER NOTE - MUSCULOSKELETAL, MLM
Spine appears normal, range of motion is not limited, no muscle or joint tenderness. Neg iron's sign BL. No joint swelling/redness/bogginess/tenderness Spine appears normal, range of motion is not limited, no muscle or joint tenderness. Neg iron's sign BL. No joint swelling/redness/bogginess/tenderness. Neg straight leg raise BL

## 2020-10-12 NOTE — ED PROVIDER NOTE - ATTENDING CONTRIBUTION TO CARE
I, Benito Tubbs, performed a history and physical exam of the patient and discussed their management with the resident and /or advanced care provider. I reviewed the resident and /or ACP's note and agree with the documented findings and plan of care. I was present and available for all procedures.  Patient with nonspecific leg cramping and reported hyponatremia last night.  Patient had Sodium of 123 last night and was provided a liter of NS and discharged, but the patients provider wanted the patient to be seen in the ED today.  Will reassess CMP, hydrate, and reasses.  Will also speak with patients physicain regarding possible other concerns.

## 2020-10-12 NOTE — ED PROVIDER NOTE - CLINICAL SUMMARY MEDICAL DECISION MAKING FREE TEXT BOX
70y f PMHx pancreatic ca s/p whipple p/w BL LE cramping x3 days. was seen at [facility] last night found to be hyponatremic from unclear etiology. pt had contacted her PMD Flip Henriquez who told her to come to [facility]. pt AOx4, well appearing, nontender LE, nonfocal exam. will give IVF, check labs, contact PMD - Alin Carranza PA-C

## 2020-10-12 NOTE — ED ADULT NURSE NOTE - OBJECTIVE STATEMENT
71 y/o female presents to ed c/o bilateral LE pain and upper arm pain described as pins and needles. Was seen yesterday and told she has low Sodium. Also c.o SOB. Denies chest pain, ha, n/v/d, abdominal pain, f/c, urinary symptoms, hematuria. A&Ox4, vss, skin warm dry and intact, MAEx4, lungs CTA, abd soft nondistended. Pt resting comfortably with VSS, no complaints at this time. Patient's bed in the lowest position, explained plan of care to patient and family members. Will continue to reassess.

## 2020-10-12 NOTE — ED PROVIDER NOTE - OBJECTIVE STATEMENT
70y F pmhx pancreatic ca s/p whipple 5yrs ago, HTN p/w leg cramping. pt reports 3 days of bilateral calf/thigh cramping intermittent in nature relieved by tylenol/motrin (last taken 2am). was seen at [facility] ED last night found to be hyponatremic (123). her PMD had wanted her to be seen at [facility] however pt reports [facility] would not transfer her. she went home, came to the ED this am. reports adequate hydration. Denies confusion, dizziness, HA, increased thirst, urinary frequency, recent travel, back pain, CP, SOB, or trauma.  PMD Flip Henriquez 70y F pmhx pancreatic ca s/p whipple 5yrs ago, HTN p/w leg cramping. pt reports 3 days of bilateral calf/thigh cramping intermittent in nature relieved by tylenol/motrin (last taken 2am). was seen at [facility] ED last night found to be hyponatremic (123), XRs revealing nonspecific L pelvic opacity. her PMD had wanted her to be seen at [facility] however pt reports [facility] would not transfer her. she went home, came to the ED this am. reports adequate hydration. Denies confusion, dizziness, HA, increased thirst, urinary frequency, recent travel, back pain, CP, SOB, or trauma.  PMD Flip Henriquez 70y F pmhx pancreatic ca s/p whipple 5yrs ago, HTN p/w leg cramping. pt reports 3 days of bilateral calf/thigh cramping intermittent in nature relieved by tylenol/motrin (last taken 2am). was seen at [facility] ED last night found to be hyponatremic (123), XRs revealing nonspecific L pelvic opacity. her PMD had wanted her to be seen at [facility] however pt reports [facility] would not transfer her. she went home, came to the ED this am. reports adequate hydration. Denies confusion, dizziness, HA, increased thirst, urinary frequency, recent travel, back pain, CP, SOB, urinary retention/incontinence, saddle anesthesia, or trauma.  PMD Flip Henriquez

## 2020-10-12 NOTE — ED PROVIDER NOTE - NSFOLLOWUPCLINICS_GEN_ALL_ED_FT
[facility]  Nephrology  [address] [place] [address], [address] [place] [address]  Phone: [phone]  Fax:   Follow Up Time:

## 2020-10-17 PROBLEM — C25.9 MALIGNANT NEOPLASM OF PANCREAS, UNSPECIFIED: Chronic | Status: ACTIVE | Noted: 2020-10-12

## 2020-11-02 ENCOUNTER — APPOINTMENT (OUTPATIENT)
Dept: NEPHROLOGY | Facility: CLINIC | Age: 70
End: 2020-11-02
Payer: MEDICARE

## 2020-11-02 ENCOUNTER — NON-APPOINTMENT (OUTPATIENT)
Age: 70
End: 2020-11-02

## 2020-11-02 VITALS
SYSTOLIC BLOOD PRESSURE: 139 MMHG | WEIGHT: 139 LBS | DIASTOLIC BLOOD PRESSURE: 87 MMHG | HEART RATE: 72 BPM | BODY MASS INDEX: 24.63 KG/M2 | HEIGHT: 63 IN | OXYGEN SATURATION: 100 %

## 2020-11-02 VITALS — TEMPERATURE: 98.8 F

## 2020-11-02 PROCEDURE — 99203 OFFICE O/P NEW LOW 30 MIN: CPT

## 2020-11-02 NOTE — HISTORY OF PRESENT ILLNESS
[FreeTextEntry1] : Ms. WALE MALDONADO is a 70 year-old woman with a PMH of asthma, peptic ulcers, pneumonia, and pancreatic neuroendocrine tumor s/p Whipple surgery in 2014 who presents [facility] for the evaluation of hyponatremia.\par \par Ms Maldonado presented to the ER on Oct 12, 2020 for bilateral calf pain. During the visit, her blood work showed that she had hyponatremia (Na-125) in the setting of a normal kidney function (serum creatinine 0.58 mg/dL. She was given IV saline in the ER and given pain medicine. In the subsequent days, her serum sodium improved to 135 - 136 mmol/L. Her most recently blood test on 10/30/20 showed a serum sodium of 135mmol/L. \par \par She is not on any diuretics. She drinks approximately 81 oz daily. She states that she urinates a lot throughout the day. She denies hematuria, frothy urine or dysuria.  She has mild lower extremity edema but denies shortness of breath, chest pain, headache. Currently, she denies having nausea/vomiting/metallic taste in her mouth. She has a good appetite. No hand tremors. She denies NSAID use or herbal supplements.

## 2020-11-02 NOTE — PHYSICAL EXAM
[General Appearance - Alert] : alert [General Appearance - In No Acute Distress] : in no acute distress [Sclera] : the sclera and conjunctiva were normal [Outer Ear] : the ears and nose were normal in appearance [Hearing Threshold Finger Rub Not [place]] : hearing was normal [Nasal Cavity] : the nasal mucosa and septum were normal [Neck Appearance] : the appearance of the neck was normal [Respiration, Rhythm And Depth] : normal respiratory rhythm and effort [Auscultation Breath Sounds / Voice Sounds] : lungs were clear to auscultation bilaterally [Heart Sounds] : normal S1 and S2 [Murmurs] : no murmurs [Heart Sounds Pericardial Friction Rub] : no pericardial rub [Edema] : there was no peripheral edema [Veins - Varicosity Changes] : there were no varicosital changes [Abdomen Soft] : soft [Abdomen Tenderness] : non-tender [Abdomen Mass (___ Cm)] : no abdominal mass palpated [No CVA Tenderness] : no ~M costovertebral angle tenderness [No Spinal Tenderness] : no spinal tenderness [Abnormal Walk] : normal gait [Musculoskeletal - Swelling] : no joint swelling seen [Skin Turgor] : normal skin turgor [] : no rash [Skin Lesions] : no skin lesions [Motor Exam] : the motor exam was normal [No Focal Deficits] : no focal deficits [Impaired Insight] : insight and judgment were intact [Affect] : the affect was normal [Mood] : the mood was normal

## 2020-11-02 NOTE — ASSESSMENT
[FreeTextEntry1] : 1. Hyponatremia - Noted to be hyponatremic on Oct 11-12, 2020. The etiology of hyponatremia is unclear. I will do further workup including serum osm, urine osm, urine Na and TSH. In addition, she is drinking a large amount of fluid (81 oz/ day). I recommend that she cuts down on her fluid intake to ~60 oz/ day. She is to monitor her input and output. We will repeat BMP in 1 week. \par \par 2. HTN - Goal BP for patient is < 130/80. Her BP is mildly above goal. She is to continue amlodipine 10mg daily for now. \par \par Patient is recommended to follow up in 2 months. \par \par

## 2020-11-04 LAB
ANION GAP SERPL CALC-SCNC: 11 MMOL/L
BUN SERPL-MCNC: 15 MG/DL
CALCIUM SERPL-MCNC: 9.2 MG/DL
CHLORIDE SERPL-SCNC: 97 MMOL/L
CO2 SERPL-SCNC: 28 MMOL/L
CREAT SERPL-MCNC: 0.68 MG/DL
GLUCOSE SERPL-MCNC: 87 MG/DL
OSMOLALITY SERPL: 292 MOSMOL/KG
OSMOLALITY UR: 142 MOSM/KG
POTASSIUM SERPL-SCNC: 4.3 MMOL/L
SODIUM ?TM SUB UR QN: 21 MMOL/L
SODIUM SERPL-SCNC: 136 MMOL/L

## 2020-11-12 ENCOUNTER — APPOINTMENT (OUTPATIENT)
Dept: OBGYN | Facility: CLINIC | Age: 70
End: 2020-11-12

## 2020-11-30 ENCOUNTER — APPOINTMENT (OUTPATIENT)
Dept: NEPHROLOGY | Facility: CLINIC | Age: 70
End: 2020-11-30

## 2020-12-28 ENCOUNTER — LABORATORY RESULT (OUTPATIENT)
Age: 70
End: 2020-12-28

## 2020-12-28 ENCOUNTER — APPOINTMENT (OUTPATIENT)
Dept: NEPHROLOGY | Facility: CLINIC | Age: 70
End: 2020-12-28
Payer: MEDICARE

## 2020-12-28 DIAGNOSIS — I10 ESSENTIAL (PRIMARY) HYPERTENSION: ICD-10-CM

## 2020-12-28 DIAGNOSIS — E87.1 HYPO-OSMOLALITY AND HYPONATREMIA: ICD-10-CM

## 2020-12-28 PROCEDURE — 99214 OFFICE O/P EST MOD 30 MIN: CPT

## 2020-12-28 NOTE — PHYSICAL EXAM
[General Appearance - Alert] : alert [General Appearance - In No Acute Distress] : in no acute distress [Sclera] : the sclera and conjunctiva were normal [Outer Ear] : the ears and nose were normal in appearance [Hearing Threshold Finger Rub Not [place]] : hearing was normal [Nasal Cavity] : the nasal mucosa and septum were normal [Neck Appearance] : the appearance of the neck was normal [Respiration, Rhythm And Depth] : normal respiratory rhythm and effort [Auscultation Breath Sounds / Voice Sounds] : lungs were clear to auscultation bilaterally [Heart Sounds] : normal S1 and S2 [Murmurs] : no murmurs [Heart Sounds Pericardial Friction Rub] : no pericardial rub [Edema] : there was no peripheral edema [Veins - Varicosity Changes] : there were no varicosital changes [Abdomen Soft] : soft [Abdomen Tenderness] : non-tender [Abdomen Mass (___ Cm)] : no abdominal mass palpated [No CVA Tenderness] : no ~M costovertebral angle tenderness [No Spinal Tenderness] : no spinal tenderness [Abnormal Walk] : normal gait [Musculoskeletal - Swelling] : no joint swelling seen [Skin Turgor] : normal skin turgor [] : no rash [Skin Lesions] : no skin lesions [Motor Exam] : the motor exam was normal [No Focal Deficits] : no focal deficits [Impaired Insight] : insight and judgment were intact [Affect] : the affect was normal [Mood] : the mood was normal

## 2020-12-28 NOTE — HISTORY OF PRESENT ILLNESS
[FreeTextEntry1] : Ms. WALE MALDONADO is a 70 year-old woman with a PMH of asthma, peptic ulcers, pneumonia, and pancreatic neuroendocrine tumor s/p Whipple surgery in 2014 who presents [facility] for the evaluation of hyponatremia.\par \par Ms Maldonado presented to the ER on Oct 12, 2020 for bilateral calf pain. During the visit, her blood work showed that she had hyponatremia (Na-125) in the setting of a normal kidney function (serum creatinine 0.58 mg/dL. She was given IV saline in the ER and given pain medicine. In the subsequent days, her serum sodium improved to 135 - 136 mmol/L. Her most recently blood test on 10/30/20 showed a serum sodium of 135mmol/L. \par \par She is not on any diuretics. She drinks approximately 81 oz daily. She states that she urinates a lot throughout the day. She denies hematuria, frothy urine or dysuria.  She has mild lower extremity edema but denies shortness of breath, chest pain, headache. Currently, she denies having nausea/vomiting/metallic taste in her mouth. She has a good appetite. No hand tremors. She denies NSAID use or herbal supplements. \par \par Interval history:\par Since the last visit, she no longer had anymore leg cramping. She has been monitoring her I/O. Her input ~82Oz, output 98 oz. Her output matches her input.

## 2020-12-28 NOTE — ASSESSMENT
[FreeTextEntry1] : 1. Hyponatremia - Noted to be hyponatremic on Oct 11-12, 2020. The etiology of hyponatremia is unclear. Her repeat BMP in Nov 2020 showed normal serum sodium/ TSH/ serum osm.  In addition, she is drinking a large amount of fluid (81 oz/ day). I recommend that she cuts down on her fluid intake to ~60 oz/ day. She is to monitor her input and output. We will repeat BMP today. \par \par 2. HTN - Goal BP for patient is < 130/80. Her clinic blood pressure is mildly above goal today but BP readings in previous visits were at goal. She is to continue amlodipine 10mg daily for now. \par \par 3. Urinary frequency - I will repeat urinalysis to rule out UTI. Explained to her that she is urinating a lot because of her fluid intake. She is recommended to cut down water intake before bed. She also has uterovaginal prolapse. I will contact her OBGYN to see if the uterovaginal prolapse could be contributing to her symptoms. \par \par Follow up is not needed. However, she can make an appointment with me anytime if new symptoms arise. \par \par

## 2020-12-29 LAB
ANION GAP SERPL CALC-SCNC: 10 MMOL/L
APPEARANCE: CLEAR
BILIRUBIN URINE: NEGATIVE
BLOOD URINE: NEGATIVE
BUN SERPL-MCNC: 17 MG/DL
CALCIUM SERPL-MCNC: 9.2 MG/DL
CHLORIDE SERPL-SCNC: 101 MMOL/L
CO2 SERPL-SCNC: 28 MMOL/L
COLOR: NORMAL
CREAT SERPL-MCNC: 0.86 MG/DL
GLUCOSE QUALITATIVE U: NEGATIVE
GLUCOSE SERPL-MCNC: 85 MG/DL
KETONES URINE: NEGATIVE
LEUKOCYTE ESTERASE URINE: ABNORMAL
NITRITE URINE: NEGATIVE
OSMOLALITY SERPL: 291 MOSMOL/KG
OSMOLALITY UR: 428 MOSM/KG
PH URINE: 6.5
POTASSIUM SERPL-SCNC: 4.3 MMOL/L
PROTEIN URINE: NEGATIVE
SODIUM ?TM SUB UR QN: 41 MMOL/L
SODIUM SERPL-SCNC: 139 MMOL/L
SPECIFIC GRAVITY URINE: 1.01
UROBILINOGEN URINE: NORMAL

## 2020-12-31 ENCOUNTER — APPOINTMENT (OUTPATIENT)
Dept: OBGYN | Facility: CLINIC | Age: 70
End: 2020-12-31
Payer: MEDICARE

## 2020-12-31 ENCOUNTER — ASOB RESULT (OUTPATIENT)
Age: 70
End: 2020-12-31

## 2020-12-31 PROCEDURE — 76830 TRANSVAGINAL US NON-OB: CPT

## 2021-01-06 ENCOUNTER — APPOINTMENT (OUTPATIENT)
Dept: OBGYN | Facility: CLINIC | Age: 71
End: 2021-01-06

## 2021-01-10 PROBLEM — Z01.419 ENCOUNTER FOR ANNUAL ROUTINE GYNECOLOGICAL EXAMINATION: Status: ACTIVE | Noted: 2018-10-23

## 2021-01-14 ENCOUNTER — APPOINTMENT (OUTPATIENT)
Dept: OBGYN | Facility: CLINIC | Age: 71
End: 2021-01-14

## 2021-01-14 DIAGNOSIS — Z01.419 ENCOUNTER FOR GYNECOLOGICAL EXAMINATION (GENERAL) (ROUTINE) W/OUT ABNORMAL FINDINGS: ICD-10-CM

## 2021-01-15 ENCOUNTER — NON-APPOINTMENT (OUTPATIENT)
Age: 71
End: 2021-01-15

## 2021-08-26 ENCOUNTER — PREPPED CHART (OUTPATIENT)
Dept: URBAN - METROPOLITAN AREA CLINIC 94 | Facility: CLINIC | Age: 71
End: 2021-08-26

## 2021-08-26 PROBLEM — H52.03 HYPEROPIA: Status: STABILIZING | Noted: 2021-08-26

## 2021-08-26 PROBLEM — H52.03 HYPEROPIA: Noted: 2021-08-26

## 2021-08-26 PROBLEM — H25.012 CORTICAL AGE-RELATED CATARACT: Noted: 2021-08-26

## 2021-08-26 PROBLEM — Z01.00 ENCOUNTER FOR EXAMINATION OF EYES AND VISION WITHOUT ABNORMAL FINDINGS: Noted: 2020-06-22

## 2021-11-17 ENCOUNTER — APPOINTMENT (OUTPATIENT)
Dept: ULTRASOUND IMAGING | Facility: CLINIC | Age: 71
End: 2021-11-17
Payer: MEDICARE

## 2021-11-17 PROCEDURE — 76830 TRANSVAGINAL US NON-OB: CPT

## 2022-02-17 ENCOUNTER — APPOINTMENT (OUTPATIENT)
Dept: ULTRASOUND IMAGING | Facility: CLINIC | Age: 72
End: 2022-02-17
Payer: MEDICARE

## 2022-02-17 ENCOUNTER — APPOINTMENT (OUTPATIENT)
Dept: MAMMOGRAPHY | Facility: CLINIC | Age: 72
End: 2022-02-17
Payer: MEDICARE

## 2022-02-17 PROCEDURE — 76641 ULTRASOUND BREAST COMPLETE: CPT | Mod: 50

## 2022-02-17 PROCEDURE — 77067 SCR MAMMO BI INCL CAD: CPT

## 2022-02-17 PROCEDURE — 77063 BREAST TOMOSYNTHESIS BI: CPT

## 2022-09-01 ENCOUNTER — COMPLETE EYE EXAM (OUTPATIENT)
Dept: URBAN - METROPOLITAN AREA CLINIC 94 | Facility: CLINIC | Age: 72
End: 2022-09-01

## 2022-09-01 DIAGNOSIS — H25.013: ICD-10-CM

## 2022-09-01 DIAGNOSIS — H52.03: ICD-10-CM

## 2022-09-01 DIAGNOSIS — H04.129: ICD-10-CM

## 2022-09-01 PROCEDURE — 92014 COMPRE OPH EXAM EST PT 1/>: CPT

## 2022-09-01 PROCEDURE — 92015 DETERMINE REFRACTIVE STATE: CPT

## 2022-09-01 ASSESSMENT — KERATOMETRY
OD_AXISANGLE_DEGREES: 10
OS_K2POWER_DIOPTERS: 47.00
OD_K2POWER_DIOPTERS: 46.25
OD_K1POWER_DIOPTERS: 45.75
OS_K1POWER_DIOPTERS: 45.75
OS_AXISANGLE2_DEGREES: 70
OS_AXISANGLE_DEGREES: 160
OD_AXISANGLE2_DEGREES: 100

## 2022-09-01 ASSESSMENT — TONOMETRY
OD_IOP_MMHG: 15
OS_IOP_MMHG: 15

## 2022-09-01 ASSESSMENT — VISUAL ACUITY
OS_CC: 20/20-2
OD_CC: 20/25+2

## 2023-01-12 ENCOUNTER — APPOINTMENT (OUTPATIENT)
Dept: ORTHOPEDIC SURGERY | Facility: CLINIC | Age: 73
End: 2023-01-12
Payer: MEDICARE

## 2023-01-12 VITALS — WEIGHT: 136 LBS | BODY MASS INDEX: 23.8 KG/M2 | HEIGHT: 63.5 IN

## 2023-01-12 DIAGNOSIS — M77.11 LATERAL EPICONDYLITIS, RIGHT ELBOW: ICD-10-CM

## 2023-01-12 PROCEDURE — 73070 X-RAY EXAM OF ELBOW: CPT | Mod: RT

## 2023-01-12 PROCEDURE — 73564 X-RAY EXAM KNEE 4 OR MORE: CPT | Mod: LT

## 2023-01-12 RX ORDER — AMLODIPINE BESYLATE 5 MG/1
TABLET ORAL
Refills: 0 | Status: ACTIVE | COMMUNITY

## 2023-01-12 RX ORDER — HYDROCHLOROTHIAZIDE 12.5 MG/1
TABLET ORAL
Refills: 0 | Status: ACTIVE | COMMUNITY

## 2023-01-12 NOTE — PHYSICAL EXAM
[NL (0)] : extension 0 degrees [Pain with Extension] : pain with extension [NL (90)] : supination 90 degrees [Right] : right elbow [5___] : hamstring 5[unfilled]/5 [] : no calf tenderness [Left] : left knee [All Views] : anteroposterior, lateral, skyline, and anteroposterior standing [Degenerative change] : Degenerative change [TWNoteComboBox7] : flexion 130 degrees [de-identified] : extension 0 degrees

## 2023-01-12 NOTE — HISTORY OF PRESENT ILLNESS
[7] : 7 [0] : 0 [Household chores] : household chores [Meds] : meds [de-identified] : 72 year old female with pain in the left knee and right elbow. Symptoms have been present in the left knee for years, she attended PT Program for the left knee last year with help, she had to stop abruptly bc her father was sick. PAin and stiffness at night, walking, stairs. Her right elbow has also been troublesome lately, she keeps banging into the lateral aspect of the elbow. Pain with gripping grasping. No numbness tingling.  [] : no [FreeTextEntry1] : left knee  [FreeTextEntry5] : pt has been having ongoing left knee pain, was doing physical therapy for awhile which helped  [FreeTextEntry9] : diclofenac  [de-identified] : movement  [de-identified] : 03/2022 [de-identified] :   [de-identified] : last year

## 2023-01-12 NOTE — DISCUSSION/SUMMARY
[de-identified] : Patient allowed to gently start resuming activities.\par Discussed change to medication prescription and usage. \par Offered cortisone steroid injection. \par Bracing options discussed with patient. \par Hyaluronic Acid inj pamphlet given to pt. \par Patient would like to try a course of PT \par \par \par 01/12/2023 \par \par  RE:  WALE MALDONADO \par \par Acct #- [id number] \par \par \par Attention:  Nurse Reviewer /Medical Director\par \par I am writing this letter as a medical necessity for PT program.\par Patient has tried analgesics, non-steroid anti-inflammatory agents, \par hot or cold compresses,injections of corticosteroids, etc)  which in combination or by themselves has not worked.\par Based on my patient's condition, I strongly believe that the PT is medically needed.\par  \par Thank you for your time and consideration.   \par

## 2023-02-22 ENCOUNTER — APPOINTMENT (OUTPATIENT)
Dept: ORTHOPEDIC SURGERY | Facility: CLINIC | Age: 73
End: 2023-02-22

## 2023-03-16 ENCOUNTER — APPOINTMENT (OUTPATIENT)
Dept: MAMMOGRAPHY | Facility: CLINIC | Age: 73
End: 2023-03-16
Payer: MEDICARE

## 2023-03-16 ENCOUNTER — APPOINTMENT (OUTPATIENT)
Dept: ULTRASOUND IMAGING | Facility: CLINIC | Age: 73
End: 2023-03-16
Payer: MEDICARE

## 2023-03-16 ENCOUNTER — APPOINTMENT (OUTPATIENT)
Dept: RADIOLOGY | Facility: CLINIC | Age: 73
End: 2023-03-16
Payer: MEDICARE

## 2023-03-16 PROCEDURE — 76830 TRANSVAGINAL US NON-OB: CPT

## 2023-03-16 PROCEDURE — 77067 SCR MAMMO BI INCL CAD: CPT

## 2023-03-16 PROCEDURE — 77063 BREAST TOMOSYNTHESIS BI: CPT

## 2023-03-16 PROCEDURE — 77080 DXA BONE DENSITY AXIAL: CPT

## 2023-03-16 PROCEDURE — 76641 ULTRASOUND BREAST COMPLETE: CPT | Mod: 50,GA

## 2023-06-01 NOTE — ED PROVIDER NOTE - BIRTH SEX
Female Render Risk Assessment In Note?: no Comment: Pt had a full skin exam with no suspicious lesion identified today. Instructed patient to do yearly skin exams and use sunscreen daily. Detail Level: Simple

## 2023-11-28 ENCOUNTER — APPOINTMENT (OUTPATIENT)
Dept: ORTHOPEDIC SURGERY | Facility: CLINIC | Age: 73
End: 2023-11-28
Payer: MEDICARE

## 2023-11-28 VITALS — HEIGHT: 63 IN | RESPIRATION RATE: 16 BRPM | WEIGHT: 142 LBS | BODY MASS INDEX: 25.16 KG/M2

## 2023-11-28 DIAGNOSIS — M65.322 TRIGGER FINGER, LEFT INDEX FINGER: ICD-10-CM

## 2023-11-28 PROCEDURE — 20550 NJX 1 TENDON SHEATH/LIGAMENT: CPT | Mod: 50

## 2024-02-05 ENCOUNTER — APPOINTMENT (OUTPATIENT)
Dept: ORTHOPEDIC SURGERY | Facility: CLINIC | Age: 74
End: 2024-02-05
Payer: MEDICARE

## 2024-02-05 DIAGNOSIS — M17.12 UNILATERAL PRIMARY OSTEOARTHRITIS, LEFT KNEE: ICD-10-CM

## 2024-02-05 DIAGNOSIS — M25.561 PAIN IN RIGHT KNEE: ICD-10-CM

## 2024-02-05 DIAGNOSIS — M17.11 UNILATERAL PRIMARY OSTEOARTHRITIS, RIGHT KNEE: ICD-10-CM

## 2024-02-05 PROCEDURE — 73564 X-RAY EXAM KNEE 4 OR MORE: CPT | Mod: 50

## 2024-02-05 PROCEDURE — 99203 OFFICE O/P NEW LOW 30 MIN: CPT

## 2024-02-05 NOTE — PHYSICAL EXAM
[5___] : hamstring 5[unfilled]/5 [All Views] : anteroposterior, lateral, skyline, and anteroposterior standing [Degenerative change] : Degenerative change [] : anterior tenderness [Negative] : negative Cydney's [Bilateral] : knee bilaterally [FreeTextEntry9] : L lat, med R [TWNoteComboBox7] : flexion 130 degrees [de-identified] : extension 0 degrees

## 2024-02-05 NOTE — HISTORY OF PRESENT ILLNESS
[de-identified] : Patient returns today for her left knee, L > R pain.  she had been attending PT program and has seen some improvement in her symptoms. Her right knee has also been troublesome for at least a year, no specific injury. She has discomfort with walking, stairs, stiffness, clicking in the knee. Uses a voltaren gel [FreeTextEntry5] : Patient is here to follow up on left knee, and presents for right knee pain that began in early 2023, not due to injury. Swelling. Pain is anterior. Denies clicking/buckling. Attended PT with slight relief for left knee. Difficulty with walking.

## 2024-02-05 NOTE — DISCUSSION/SUMMARY
[de-identified] : modify activities use elastic sleeve try OTC meds ice as needed try topical lidocaine reviewed current medications used by this patient 02/05/2024    RE:  WALE MALDONADO   Acct #- [id number]    Attention:  Nurse Reviewer /Medical Director  I am writing this letter as a medical necessity for PT program. Patient has tried analgesics, non-steroid anti-inflammatory agents,  hot or cold compresses,injections of corticosteroids, etc)  which in combination or by themselves has not worked. Based on my patient's condition, I strongly believe that the PT is medically needed.   Thank you for your time and consideration.

## 2024-04-01 ENCOUNTER — APPOINTMENT (OUTPATIENT)
Dept: MAMMOGRAPHY | Facility: CLINIC | Age: 74
End: 2024-04-01
Payer: MEDICARE

## 2024-04-01 ENCOUNTER — APPOINTMENT (OUTPATIENT)
Dept: ULTRASOUND IMAGING | Facility: CLINIC | Age: 74
End: 2024-04-01
Payer: MEDICARE

## 2024-04-01 PROCEDURE — 77063 BREAST TOMOSYNTHESIS BI: CPT

## 2024-04-01 PROCEDURE — 77067 SCR MAMMO BI INCL CAD: CPT

## 2024-04-01 PROCEDURE — 76641 ULTRASOUND BREAST COMPLETE: CPT | Mod: 50,GY

## 2024-06-05 ENCOUNTER — APPOINTMENT (OUTPATIENT)
Dept: ORTHOPEDIC SURGERY | Facility: CLINIC | Age: 74
End: 2024-06-05
Payer: MEDICARE

## 2024-06-05 DIAGNOSIS — M65.332 TRIGGER FINGER, LEFT MIDDLE FINGER: ICD-10-CM

## 2024-06-05 DIAGNOSIS — M65.342 TRIGGER FINGER, LEFT RING FINGER: ICD-10-CM

## 2024-06-05 DIAGNOSIS — M65.341 TRIGGER FINGER, RIGHT RING FINGER: ICD-10-CM

## 2024-06-05 PROCEDURE — 99214 OFFICE O/P EST MOD 30 MIN: CPT

## 2024-06-05 NOTE — PHYSICAL EXAM
[de-identified] : Has trigger fingers bilateral middle and ring fingers with triggering locking positive lift maneuver.  Nontender A1 pulley index and small or thumbs bilaterally.  Negative Tinel's over carpal tunnel

## 2024-06-05 NOTE — ASSESSMENT
[FreeTextEntry1] : Patient with persistent left middle and ring trigger digits.  Injections did not help.  Actively triggering or locking.  Also having some left shoulder pain possibly.  Recommend left middle and ring flex maximum decompression  The risks benefits and alternatives were discussed with the patient including, but not limited to:   infection, nerve injury, need for FDS slip excision, CRPS, anesthetic block injury, persistent symptoms, scar sensitivity, PIP contracture, pulley injury, recurrence etc.  The patient would like to proceed with surgery.  Shared decision-making was undertaken  Explained the importance of postop motion of her fingers as well as her shoulder and to have her shoulder evaluated to ensure nothing needs to be done for this

## 2025-01-20 ENCOUNTER — COMPLETE EYE EXAM (OUTPATIENT)
Dept: URBAN - METROPOLITAN AREA CLINIC 82 | Facility: CLINIC | Age: 75
End: 2025-01-20

## 2025-01-20 DIAGNOSIS — H04.123: ICD-10-CM

## 2025-01-20 DIAGNOSIS — H25.813: ICD-10-CM

## 2025-01-20 PROCEDURE — 92014 COMPRE OPH EXAM EST PT 1/>: CPT

## 2025-01-20 ASSESSMENT — KERATOMETRY
OS_AXISANGLE_DEGREES: 160
OS_K1POWER_DIOPTERS: 45.75
OD_AXISANGLE2_DEGREES: 100
OS_K2POWER_DIOPTERS: 47.00
OD_K1POWER_DIOPTERS: 45.75
OD_K2POWER_DIOPTERS: 46.25
OS_AXISANGLE2_DEGREES: 70
OD_AXISANGLE_DEGREES: 10

## 2025-01-20 ASSESSMENT — VISUAL ACUITY
OD_CC: 20/20-1
OS_CC: 20/20-1

## 2025-01-20 ASSESSMENT — TONOMETRY
OD_IOP_MMHG: 13
OS_IOP_MMHG: 12
OS_IOP_MMHG: 13
OD_IOP_MMHG: 11

## 2025-04-07 ENCOUNTER — APPOINTMENT (OUTPATIENT)
Dept: MAMMOGRAPHY | Facility: CLINIC | Age: 75
End: 2025-04-07
Payer: MEDICARE

## 2025-04-07 ENCOUNTER — APPOINTMENT (OUTPATIENT)
Dept: ULTRASOUND IMAGING | Facility: CLINIC | Age: 75
End: 2025-04-07
Payer: MEDICARE

## 2025-04-07 ENCOUNTER — APPOINTMENT (OUTPATIENT)
Dept: RADIOLOGY | Facility: CLINIC | Age: 75
End: 2025-04-07
Payer: MEDICARE

## 2025-04-07 PROCEDURE — 77067 SCR MAMMO BI INCL CAD: CPT

## 2025-04-07 PROCEDURE — 77080 DXA BONE DENSITY AXIAL: CPT

## 2025-04-07 PROCEDURE — 77063 BREAST TOMOSYNTHESIS BI: CPT

## 2025-04-07 PROCEDURE — 76641 ULTRASOUND BREAST COMPLETE: CPT | Mod: 50,GA

## 2025-04-28 NOTE — REASON FOR VISIT
Refills sent; ok to schedule sooner in Friday appt slots (looks like I spots 05/09 and 05/16). Thanks!    [Initial Evaluation] : an initial evaluation for [FreeTextEntry2] : thyroid nodule